# Patient Record
(demographics unavailable — no encounter records)

---

## 2024-11-22 NOTE — ASSESSMENT
[FreeTextEntry1] : EGD Report / findings reviewed and discussed with patient, revealing a large hiatal hernia (10 cm), reflux esophagitis and non-erosive gastritis.  Alternatives discussed such as hiatal hernia repair with LINX vs Rouen -Y procedure.   -Plan Esophagram CT Chest / Abdomen w/ PO & IV contrast BMP for IV contrast  administration done at [facility] (Creatine 0.78) F/U CTS for review of testing and surgical planning I, Sumit Dave saw, examined and reviewed the diagnostic images on patient:  RUBENS WOLFE on 11/19/2024 and agreed with my Nurse Practitioner's clinical note, physical exam findings and treatment plan.

## 2024-11-22 NOTE — HISTORY OF PRESENT ILLNESS
[FreeTextEntry1] : Ms. RUBENS WOLFE 57 year F Never smoker, that arrives today for a consultation for a GERD/ Reflux/Hiatal Hernial.  Patient was seen by their GI provider for complaints of regurgitation, acid reflux, nausea and vomiting. Denies any dysphagia.  Patient has been experiencing symptoms for 8 years. Patient on Omeprazole and Famotidine with moderate relief. EGD 9/26/22 reveals reflux esophagitis (Grade II with erosions), non-erosive gastritis of the stomach and a large hiatal hernia (10 cm). PMHX HTN, ZOILA, depression, Facial paralysis, IBS w/ chronic constipation, GERD, Mccormick's Esophagus, Gastric Sleeve with hiatal hernia repair (Dr. Rod Solano @ [facility]), uterine leiomyoma s/p hysterectomy, ORIF Left distal Radius (4/24/24)left Hip Labrum repair 2013. Here today for surgical discussion.  Baseline GERD Score: 58 ECOG 0 , Independent Lives with Partner Never smoker COVID History- Vaccinated  Denies major psychiatric history:   Their Healthcare team is as follows: PMD: [place] Cardiologist:Chaitanya Gastroenterologist: Katia (formerly) / Dr. Jesica Patel: Tracey    Employed as a

## 2024-11-22 NOTE — HISTORY OF PRESENT ILLNESS
[FreeTextEntry1] : Ms. RUBENS WOLFE 57 year F Never smoker, that arrives today for a consultation for a GERD/ Reflux/Hiatal Hernial.  Patient was seen by their GI provider for complaints of regurgitation, acid reflux, nausea and vomiting. Denies any dysphagia.  Patient has been experiencing symptoms for 8 years. Patient on Omeprazole and Famotidine with moderate relief. EGD 9/26/22 reveals reflux esophagitis (Grade II with erosions), non-erosive gastritis of the stomach and a large hiatal hernia (10 cm). PMHX HTN, ZOILA, depression, Facial paralysis, IBS w/ chronic constipation, GERD, Mccormick's Esophagus, Gastric Sleeve with hiatal hernia repair (Dr. Rod Solano @ [facility]), uterine leiomyoma s/p hysterectomy, ORIF Left distal Radius (4/24/24)left Hip Labrum repair 2013. Here today for surgical discussion.  Baseline GERD Score: 58 ECOG 0 , Independent Lives with Partner Never smoker COVID History- Vaccinated  Denies major psychiatric history:   Their Healthcare team is as follows: PMD: [place] Cardiologist:Chaitanya Gastroenterologist: Katia (formerly) / Dr. Jesica Patel: Tracey    Employed as a

## 2024-11-22 NOTE — ASSESSMENT
[FreeTextEntry1] : EGD Report / findings reviewed and discussed with patient, revealing a large hiatal hernia (10 cm), reflux esophagitis and non-erosive gastritis.  Alternatives discussed such as hiatal hernia repair with LINX vs Rouen -Y procedure.   -Plan Esophagram CT Chest / Abdomen w/ PO & IV contrast BMP for IV contrast  administration done at [facility] (Creatine 0.78) F/U CTS for review of testing and surgical planning I, Sumit Dave saw, examined and reviewed the diagnostic images on patient:  RUBENS WOLFE on 11/19/2024 and agreed with my Nurse Practitioner's clinical note, physical exam findings and treatment plan.

## 2024-11-22 NOTE — REASON FOR VISIT
[Consultation] : a consultation visit [Other: _____] : [unfilled] [FreeTextEntry1] : Hiatal Hernia evaluation

## 2024-11-22 NOTE — HISTORY OF PRESENT ILLNESS
[FreeTextEntry1] : Ms. RUBENS WOLFE 57 year F Never smoker, that arrives today for a consultation for a GERD/ Reflux/Hiatal Hernial.  Patient was seen by their GI provider for complaints of regurgitation, acid reflux, nausea and vomiting. Denies any dysphagia.  Patient has been experiencing symptoms for 8 years. Patient on Omeprazole and Famotidine with moderate relief. EGD 9/26/22 reveals reflux esophagitis (Grade II with erosions), non-erosive gastritis of the stomach and a large hiatal hernia (10 cm). PMHX HTN, ZOILA, depression, Facial paralysis, IBS w/ chronic constipation, GERD, Mccormick's Esophagus, Gastric Sleeve with hiatal hernia repair (Dr. Rod Solano @ [facility]), uterine leiomyoma s/p hysterectomy, ORIF Left distal Radius (4/24/24)left Hip Labrum repair 2013. Here today for surgical discussion.  Baseline GERD Score: 58 ECOG 0 , Independent Lives with Partner Never smoker COVID History- Vaccinated  Denies major psychiatric history:   Their Healthcare team is as follows: PMD: [place] Cardiologist:Chaitanya Gastroenterologist: Katia (formerly) / Dr. Jesica Patel: Tracey    Employed as a

## 2024-11-22 NOTE — HISTORY OF PRESENT ILLNESS
[FreeTextEntry1] : Ms. RUBENS WOLFE 57 year F Never smoker, that arrives today for a consultation for a GERD/ Reflux/Hiatal Hernial.  Patient was seen by their GI provider for complaints of regurgitation, acid reflux, nausea and vomiting. Denies any dysphagia.  Patient has been experiencing symptoms for 8 years. Patient on Omeprazole and Famotidine with moderate relief. EGD 9/26/22 reveals reflux esophagitis (Grade II with erosions), non-erosive gastritis of the stomach and a large hiatal hernia (10 cm). PMHX HTN, ZOILA, depression, Facial paralysis, IBS w/ chronic constipation, GERD, Mccormick's Esophagus, Gastric Sleeve with hiatal hernia repair (Dr. Rod Solano @ [facility]), uterine leiomyoma s/p hysterectomy, ORIF Left distal Radius (4/24/24)left Hip Labrum repair 2013. Here today for surgical discussion.  Baseline GERD Score: 58 ECOG 0 , Independent Lives with Partner Never smoker COVID History- Vaccinated  Denies major psychiatric history:   Their Healthcare team is as follows: PMD: [place] Cardiologist:Chaitanya Gastroenterologist: Katia (formerly) / Dr. Jesica Patel: Tracey    Employed as a

## 2024-11-22 NOTE — ASSESSMENT
[FreeTextEntry1] : EGD Report / findings reviewed and discussed with patient, revealing a large hiatal hernia (10 cm), reflux esophagitis and non-erosive gastritis.  Alternatives discussed such as hiatal hernia repair with LINX vs Rouen -Y procedure.   -Plan Esophagram CT Chest / Abdomen w/ PO & IV contrast BMP for IV contrast  administration done at [facility] (Creatine 0.78) F/U CTS for review of testing and surgical planning I, Sumit Dave saw, examined and reviewed the diagnostic images on patient:  RUBENS WOLFE on 11/19/2024 and agreed with my Nurse Practitioner's clinical note, physical exam findings and treatment plan.

## 2024-11-22 NOTE — ASSESSMENT
[FreeTextEntry1] : EGD Report / findings reviewed and discussed with patient, revealing a large hiatal hernia (10 cm), reflux esophagitis and non-erosive gastritis.  Alternatives discussed such as hiatal hernia repair with LINX vs Rouen -Y procedure.   -Plan Esophagram CT Chest / Abdomen w/ PO & IV contrast BMP for IV contrast  administration done at [place] (Creatine 0.78) F/U CTS for review of testing and surgical planning I, Sumit Dave saw, examined and reviewed the diagnostic images on patient:  RUBENS WOLFE on 11/19/2024 and agreed with my Nurse Practitioner's clinical note, physical exam findings and treatment plan.

## 2025-02-20 NOTE — PHYSICAL EXAM
[Sclera] : the sclera and conjunctiva were normal [Neck Appearance] : the appearance of the neck was normal [Respiration, Rhythm And Depth] : normal respiratory rhythm and effort [Heart Rate And Rhythm] : heart rate was normal and rhythm regular [Bowel Sounds] : normal bowel sounds [Abdomen Soft] : soft [Involuntary Movements] : no involuntary movements were seen [Skin Color & Pigmentation] : normal skin color and pigmentation [No Focal Deficits] : no focal deficits [Oriented To Time, Place, And Person] : oriented to person, place, and time [Impaired Insight] : insight and judgment were intact

## 2025-02-24 NOTE — ASSESSMENT
[FreeTextEntry1] : Ms. RUBENS WOLFE 57 year F Never smoker, here today for review of CT chest/abd/pelvis and esophagram in work up for hiatal hernia repair.   c/o reflux ongoing despite PPI and dietary adjustments  Had recent abdominal pain, saw her GI - had repeat EGD revealing known HH  CT chest/abd and esophagram reviewed  Pt is pending vacation to [facility] in end of June for 17 days Also scheduled for facial nerve transfer surgery d/t facial paralysis after vacation  Discussed Ignacio en y  procedure for reflux and HH repair with LINX  Want to schedule surgery after trip and facial surgery in June   Pt agreeable to HH repair with LINX for reflux   F/U CTS May for further surgical discussion  I, Sumit Dave saw, examined and reviewed the diagnostic images on patient:  RUBENS WOLFE on 02/18/2025 and agreed with my Nurse Practitioner's clinical note, physical exam findings and treatment plan.

## 2025-02-24 NOTE — HISTORY OF PRESENT ILLNESS
[FreeTextEntry1] : Ms. RUBENS WOLFE 57 year F Never smoker, here today for review of CT chest/abd/pelvis and esophagram in work up for hiatal hernia repair.   Patient was seen by their GI provider for complaints of regurgitation, acid reflux, nausea and vomiting. Denies any dysphagia. Patient has been experiencing symptoms for 8 years. Patient on Omeprazole and Famotidine with moderate relief. EGD 9/26/22 reveals reflux esophagitis (Grade II with erosions), non-erosive gastritis of the stomach and a large hiatal hernia (10 cm). PMHX HTN, ZOILA, depression, Facial paralysis, IBS w/ chronic constipation, GERD, Mccormick's Esophagus, Gastric Sleeve with hiatal hernia repair (Dr. Rod Solano @ [facility]), uterine leiomyoma s/p hysterectomy, ORIF Left distal Radius (4/24/24)left Hip Labrum repair 2013. Here today for surgical discussion.   Baseline GERD Score: 58 ECOG 0 , Independent Lives with Partner Never smoker COVID History- Vaccinated Denies major psychiatric history:  Their Healthcare team is as follows: PMD: [place] Cardiologist:None Gastroenterologist: Katia (formerly) / Dr. Jesica Patel: Tracey Employed as a

## 2025-02-24 NOTE — ASSESSMENT
[FreeTextEntry1] : Ms. RUBENS WOLFE 57 year F Never smoker, here today for review of CT chest/abd/pelvis and esophagram in work up for hiatal hernia repair.   c/o reflux ongoing despite PPI and dietary adjustments  Had recent abdominal pain, saw her GI - had repeat EGD revealing known HH  CT chest/abd and esophagram reviewed  Pt is pending vacation to [facility] in end of June for 17 days Also scheduled for facial nerve transfer surgery d/t facial paralysis after vacation  Discussed Ignacio en y  procedure for reflux and HH repair with LINX  Want to schedule surgery after trip and facial surgery in June   Pt agreeable to HH repair with LINX for reflux   F/U CTS May for further surgical discussion  I, Sumit Dave saw, examined and reviewed the diagnostic images on patient:  RUBENS WOLFE on 02/18/2025 and agreed with my Nurse Practitioner's clinical note, physical exam findings and treatment plan.

## 2025-02-24 NOTE — ASSESSMENT
[FreeTextEntry1] : Ms. RUBENS WOLFE 57 year F Never smoker, here today for review of CT chest/abd/pelvis and esophagram in work up for hiatal hernia repair.   c/o reflux ongoing despite PPI and dietary adjustments  Had recent abdominal pain, saw her GI - had repeat EGD revealing known HH  CT chest/abd and esophagram reviewed  Pt is pending vacation to [facility] in end of June for 17 days Also scheduled for facial nerve transfer surgery d/t facial paralysis after vacation  Discussed Ignacio en y  procedure for reflux and HH repair with LINX  Want to schedule surgery after trip and facial surgery in June   Pt agreeable to HH repair with LINX for reflux   F/U CTS May for further surgical discussion  I, Sumit Dave saw, examined and reviewed the diagnostic images on patient:  RUBENS WOLFE on 02/18/2025 and agreed with my Nurse Practitioner's clinical note, physical exam findings and treatment plan.

## 2025-02-24 NOTE — HISTORY OF PRESENT ILLNESS
[FreeTextEntry1] : Ms. RUBENS WOLFE 57 year F Never smoker, here today for review of CT chest/abd/pelvis and esophagram in work up for hiatal hernia repair.   Patient was seen by their GI provider for complaints of regurgitation, acid reflux, nausea and vomiting. Denies any dysphagia. Patient has been experiencing symptoms for 8 years. Patient on Omeprazole and Famotidine with moderate relief. EGD 9/26/22 reveals reflux esophagitis (Grade II with erosions), non-erosive gastritis of the stomach and a large hiatal hernia (10 cm). PMHX HTN, ZOILA, depression, Facial paralysis, IBS w/ chronic constipation, GERD, Mccormick's Esophagus, Gastric Sleeve with hiatal hernia repair (Dr. Rod Solano @ [facility]), uterine leiomyoma s/p hysterectomy, ORIF Left distal Radius (4/24/24)left Hip Labrum repair 2013. Here today for surgical discussion.   Baseline GERD Score: 58 ECOG 0 , Independent Lives with Partner Never smoker COVID History- Vaccinated Denies major psychiatric history:  Their Healthcare team is as follows: PMD: [place] Cardiologist:None Gastroenterologist: Katia (formerly) / Dr. Jesica Patel: Tracey Employed as a

## 2025-02-24 NOTE — ASSESSMENT
[FreeTextEntry1] : Ms. RUBENS WOLFE 57 year F Never smoker, here today for review of CT chest/abd/pelvis and esophagram in work up for hiatal hernia repair.   c/o reflux ongoing despite PPI and dietary adjustments  Had recent abdominal pain, saw her GI - had repeat EGD revealing known HH  CT chest/abd and esophagram reviewed  Pt is pending vacation to [facility] in end of June for 17 days Also scheduled for facial nerve transfer surgery d/t facial paralysis after vacation  Discussed Ignacio en y  procedure for reflux and HH repair with LINX  Want to schedule surgery after trip and facial surgery in June   Pt agreeable to HH repair with LINX for reflux   F/U CTS May for further surgical discussion  I, Sumit Dave saw, examined and reviewed the diagnostic images on patient:  RUBENS WOLFE on 02/18/2025 and agreed with my Nurse Practitioner's clinical note, physical exam findings and treatment plan.

## 2025-02-24 NOTE — HISTORY OF PRESENT ILLNESS
[FreeTextEntry1] : Ms. RUBENS WOLFE 57 year F Never smoker, here today for review of CT chest/abd/pelvis and esophagram in work up for hiatal hernia repair.   Patient was seen by their GI provider for complaints of regurgitation, acid reflux, nausea and vomiting. Denies any dysphagia. Patient has been experiencing symptoms for 8 years. Patient on Omeprazole and Famotidine with moderate relief. EGD 9/26/22 reveals reflux esophagitis (Grade II with erosions), non-erosive gastritis of the stomach and a large hiatal hernia (10 cm). PMHX HTN, ZOILA, depression, Facial paralysis, IBS w/ chronic constipation, GERD, Mccormick's Esophagus, Gastric Sleeve with hiatal hernia repair (Dr. Rod Solano @ [facility]), uterine leiomyoma s/p hysterectomy, ORIF Left distal Radius (4/24/24)left Hip Labrum repair 2013. Here today for surgical discussion.   Baseline GERD Score: 58 ECOG 0 , Independent Lives with Partner Never smoker COVID History- Vaccinated Denies major psychiatric history:  Their Healthcare team is as follows: PMD: [place] Cardiologist:None Gastroenterologist: Katia (formerly) / Dr. Jesica Patel: Tracey Employed as a

## 2025-02-24 NOTE — HISTORY OF PRESENT ILLNESS
[FreeTextEntry1] : Ms. RUBENS WOLFE 57 year F Never smoker, here today for review of CT chest/abd/pelvis and esophagram in work up for hiatal hernia repair.   Patient was seen by their GI provider for complaints of regurgitation, acid reflux, nausea and vomiting. Denies any dysphagia. Patient has been experiencing symptoms for 8 years. Patient on Omeprazole and Famotidine with moderate relief. EGD 9/26/22 reveals reflux esophagitis (Grade II with erosions), non-erosive gastritis of the stomach and a large hiatal hernia (10 cm). PMHX HTN, ZOILA, depression, Facial paralysis, IBS w/ chronic constipation, GERD, Mccormick's Esophagus, Gastric Sleeve with hiatal hernia repair (Dr. Rod Solano @ [facility]), uterine leiomyoma s/p hysterectomy, ORIF Left distal Radius (4/24/24)left Hip Labrum repair 2013. Here today for surgical discussion.   Baseline GERD Score: 58 ECOG 0 , Independent Lives with Partner Never smoker COVID History- Vaccinated Denies major psychiatric history:  Their Healthcare team is as follows: PMD: [place] Cardiologist:None Gastroenterologist: Katia (formerly) / Dr. Jesica Patel: Tracey Employed as a

## 2025-06-24 NOTE — ASSESSMENT
[FreeTextEntry1] : Ms. RUBENS WOLFE 57 year F Never smoker, here today for review of CT chest/abd/pelvis and esophagram in work up for hiatal hernia repair.  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 Patient presented to the office for follow-up.  I had seen her a few months prior for typical GERD symptoms and a hiatal hernia.  Patient with significant past surgical history of sleeve gastrectomy.  I reviewed CT scan and esophagram with evidence of moderate hide hiatal hernia, gastroesophageal reflux, no dysmotility.  I again discussed the surgical options given history of sleeve gastrectomy: Ignacio-en-Y gastric bypass or hiatal hernia repair with Linx device.  Patient expressed preference for hiatal hernia repair and Linx device.  I again described the procedures, surgical risk and possible complication as well as expected recovery.  Surgical side effects associated with the procedure such as gas bloat and dysphagia among others were discussed.  Patient is agreeable for surgery.  He is scheduled for a facial nerve transfer surgery and she will return to the office after that surgery to schedule antireflux surgery.

## 2025-06-24 NOTE — HISTORY OF PRESENT ILLNESS
[FreeTextEntry1] : Ms. RUBENS WOLFE 57 year F Never smoker, here today for review of CT chest/abd/pelvis and esophagram in work up for hiatal hernia repair.  Patient was seen by their GI provider for complaints of regurgitation, acid reflux, nausea and vomiting. Denies any dysphagia. Patient has been experiencing symptoms for 8 years. Patient on Omeprazole and Famotidine with moderate relief. EGD 9/26/22 reveals reflux esophagitis (Grade II with erosions), non-erosive gastritis of the stomach and a large hiatal hernia (10 cm). PMHX HTN, ZOILA, depression, Facial paralysis, IBS w/ chronic constipation, GERD, Mccormick's Esophagus, Gastric Sleeve with hiatal hernia repair (Dr. Rod Solano @ [facility]), uterine leiomyoma s/p hysterectomy, ORIF Left distal Radius (4/24/24)left Hip Labrum repair 2013. Here today for surgical discussion.  Baseline GERD Score: 58 ECOG 0 , Independent Lives with Partner Never smoker COVID History- Vaccinated Denies major psychiatric history:  Their Healthcare team is as follows: PMD: [place] Cardiologist:None Gastroenterologist: Katia (formerly) / Dr. Jesica Patel: Tracey Employed as a

## 2025-06-24 NOTE — HISTORY OF PRESENT ILLNESS
[FreeTextEntry1] : Ms. RUBENS WOLFE 57 year F Never smoker, here today for review of CT chest/abd/pelvis and esophagram in work up for hiatal hernia repair.  Patient was seen by their GI provider for complaints of regurgitation, acid reflux, nausea and vomiting. Denies any dysphagia. Patient has been experiencing symptoms for 8 years. Patient on Omeprazole and Famotidine with moderate relief. EGD 9/26/22 reveals reflux esophagitis (Grade II with erosions), non-erosive gastritis of the stomach and a large hiatal hernia (10 cm). PMHX HTN, ZOILA, depression, Facial paralysis, IBS w/ chronic constipation, GERD, Mccormick's Esophagus, Gastric Sleeve with hiatal hernia repair (Dr. Rod Solano @ [facility]), uterine leiomyoma s/p hysterectomy, ORIF Left distal Radius (4/24/24)left Hip Labrum repair 2013. Here today for surgical discussion.  Baseline GERD Score: 58 ECOG 0 , Independent Lives with Partner Never smoker COVID History- Vaccinated Denies major psychiatric history:  Their Healthcare team is as follows: PMD: [place] Cardiologist:None Gastroenterologist: Katia (formerly) / Dr. Jesica Patel: Tracey Employed as a

## 2025-06-24 NOTE — ASSESSMENT
[FreeTextEntry1] : Ms. RUBENS WOLFE 57 year F Never smoker, here today for review of CT chest/abd/pelvis and esophagram in work up for hiatal hernia repair.  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 Patient presented to the office for follow-up.  I had seen her a few months prior for typical GERD symptoms and a hiatal hernia.  Patient with significant past surgical history of sleeve gastrectomy.  I reviewed CT scan and esophagram with evidence of moderate hide hiatal hernia, gastroesophageal reflux, no dysmotility.  I again discussed the surgical options given history of sleeve gastrectomy: Ignacio-en-Y gastric bypass or hiatal hernia repair with Linx device.  Patient expressed preference for hiatal hernia repair and Linx device.  I again described the procedures, surgical risk and possible complication as well as expected recovery.  Surgical side effects associated with the procedure such as gas bloat and dysphagia among others were discussed.  Patient is agreeable for surgery.  He is scheduled for a facial nerve transfer surgery and she will return to the office after that surgery to schedule antireflux surgery.